# Patient Record
Sex: FEMALE | URBAN - METROPOLITAN AREA
[De-identification: names, ages, dates, MRNs, and addresses within clinical notes are randomized per-mention and may not be internally consistent; named-entity substitution may affect disease eponyms.]

---

## 2023-11-27 ENCOUNTER — OFFICE VISIT (OUTPATIENT)
Dept: URBAN - METROPOLITAN AREA CLINIC 17 | Facility: CLINIC | Age: 83
End: 2023-11-27

## 2024-01-04 ENCOUNTER — LAB OUTSIDE AN ENCOUNTER (OUTPATIENT)
Dept: URBAN - METROPOLITAN AREA CLINIC 17 | Facility: CLINIC | Age: 84
End: 2024-01-04

## 2024-01-04 ENCOUNTER — OFFICE VISIT (OUTPATIENT)
Dept: URBAN - METROPOLITAN AREA CLINIC 17 | Facility: CLINIC | Age: 84
End: 2024-01-04
Payer: MEDICARE

## 2024-01-04 DIAGNOSIS — R19.4 BOWEL HABIT CHANGES: ICD-10-CM

## 2024-01-04 DIAGNOSIS — K86.2 PANCREATIC CYST: ICD-10-CM

## 2024-01-04 DIAGNOSIS — N28.1 KIDNEY CYSTS: ICD-10-CM

## 2024-01-04 DIAGNOSIS — R54 ADVANCED AGE: ICD-10-CM

## 2024-01-04 DIAGNOSIS — D18.03 LIVER HEMANGIOMA: ICD-10-CM

## 2024-01-04 PROBLEM — 49808004: Status: ACTIVE | Noted: 2024-01-04

## 2024-01-04 PROCEDURE — 99204 OFFICE O/P NEW MOD 45 MIN: CPT | Performed by: INTERNAL MEDICINE

## 2024-01-04 RX ORDER — RAMIPRIL 2.5 MG/1
1 CAPSULE CAPSULE ORAL ONCE A DAY
Refills: 0 | Status: ACTIVE | COMMUNITY
Start: 1900-01-01

## 2024-01-04 RX ORDER — KRILL/OM-3/DHA/EPA/PHOSPHO/AST 1000-230MG
1 TABLET CAPSULE ORAL ONCE A DAY
Qty: 30 | Status: ACTIVE | COMMUNITY
Start: 2024-01-04 | End: 2024-02-03

## 2024-01-04 RX ORDER — ROSUVASTATIN CALCIUM 5 MG/1
1 TABLET TABLET, COATED ORAL ONCE A DAY
Qty: 30 | Status: ACTIVE | COMMUNITY
Start: 2024-01-04

## 2024-01-04 NOTE — HPI-TODAY'S VISIT:
1/4/24 84 yo lady pt of Dr Margarita Braun.  I did her colonoscopy in 2019 for + cologuard.  Sheis here for an abnormal CT from 7/2023 showing benign liver hemangioma, small pancreatic cysts largest 1.3 cm and renal cysts.  No abdominal pain.  Had some diarrhea after taking amoxcillin in Oct for an acute sinus infection.  After that she got constipated.  Some days she will have regular BMs dailly and some days will go 2 days w/o a BM.

## 2024-01-18 ENCOUNTER — LAB OUTSIDE AN ENCOUNTER (OUTPATIENT)
Dept: URBAN - METROPOLITAN AREA CLINIC 105 | Facility: CLINIC | Age: 84
End: 2024-01-18

## 2024-02-29 ENCOUNTER — OV EP (OUTPATIENT)
Dept: URBAN - METROPOLITAN AREA CLINIC 17 | Facility: CLINIC | Age: 84
End: 2024-02-29

## 2024-02-29 VITALS
SYSTOLIC BLOOD PRESSURE: 180 MMHG | HEIGHT: 64 IN | TEMPERATURE: 97.3 F | HEART RATE: 65 BPM | WEIGHT: 133 LBS | DIASTOLIC BLOOD PRESSURE: 62 MMHG | BODY MASS INDEX: 22.71 KG/M2

## 2024-02-29 RX ORDER — ASPIRIN 81 MG/1
1 TABLET TABLET, COATED ORAL ONCE A DAY
Status: ACTIVE | COMMUNITY

## 2024-02-29 RX ORDER — ROSUVASTATIN CALCIUM 5 MG/1
1 TABLET TABLET, COATED ORAL ONCE A DAY
Qty: 30 | Status: ACTIVE | COMMUNITY
Start: 2024-01-04

## 2024-02-29 RX ORDER — RAMIPRIL 2.5 MG/1
1 CAPSULE CAPSULE ORAL ONCE A DAY
Refills: 0 | Status: ACTIVE | COMMUNITY
Start: 1900-01-01

## 2024-02-29 NOTE — HPI-TODAY'S VISIT:
1/4/24 82 yo lady pt of Dr Margarita Braun.  I did her colonoscopy in 2019 for + cologuard.  Sheis here for an abnormal CT from 7/2023 showing benign liver hemangioma, small pancreatic cysts largest 1.3 cm and renal cysts.  No abdominal pain.  Had some diarrhea after taking amoxcillin in Oct for an acute sinus infection.  After that she got constipated.  Some days she will have regular BMs dailly and some days will go 2 days w/o a BM.  2/29/24 occ HB relieved with drinking milk

## 2024-05-30 ENCOUNTER — OFFICE VISIT (OUTPATIENT)
Dept: URBAN - METROPOLITAN AREA CLINIC 17 | Facility: CLINIC | Age: 84
End: 2024-05-30